# Patient Record
Sex: FEMALE | Race: WHITE | NOT HISPANIC OR LATINO | Employment: OTHER | URBAN - METROPOLITAN AREA
[De-identification: names, ages, dates, MRNs, and addresses within clinical notes are randomized per-mention and may not be internally consistent; named-entity substitution may affect disease eponyms.]

---

## 2024-07-01 PROBLEM — M60.9 MYOSITIS: Status: ACTIVE | Noted: 2024-07-01

## 2024-07-02 ENCOUNTER — OFFICE VISIT (OUTPATIENT)
Age: 64
End: 2024-07-02
Payer: MEDICARE

## 2024-07-02 VITALS
SYSTOLIC BLOOD PRESSURE: 102 MMHG | DIASTOLIC BLOOD PRESSURE: 70 MMHG | BODY MASS INDEX: 27.66 KG/M2 | WEIGHT: 162 LBS | TEMPERATURE: 97.8 F | HEIGHT: 64 IN

## 2024-07-02 DIAGNOSIS — Z79.899 HIGH RISK MEDICATION USE: ICD-10-CM

## 2024-07-02 DIAGNOSIS — R76.8 ABNORMAL ANCA (ANTINEUTROPHIL CYTOPLASMIC ANTIBODY): ICD-10-CM

## 2024-07-02 DIAGNOSIS — M60.9 MYOSITIS, UNSPECIFIED MYOSITIS TYPE, UNSPECIFIED SITE: Primary | ICD-10-CM

## 2024-07-02 DIAGNOSIS — R89.9 ABNORMAL LABORATORY TEST: ICD-10-CM

## 2024-07-02 DIAGNOSIS — J84.9 INTERSTITIAL LUNG DISEASE: ICD-10-CM

## 2024-07-02 RX ORDER — MYCOPHENOLATE MOFETIL 500 MG/1
1000 TABLET ORAL 2 TIMES DAILY
COMMUNITY
Start: 2024-05-16 | End: 2025-05-11

## 2024-07-02 RX ORDER — LOSARTAN POTASSIUM 25 MG/1
0.5 TABLET ORAL DAILY
COMMUNITY
Start: 2024-04-25

## 2024-07-02 RX ORDER — NAPROXEN 375 MG/1
375 TABLET ORAL 2 TIMES DAILY WITH MEALS
COMMUNITY

## 2024-07-02 NOTE — ASSESSMENT & PLAN NOTE
Elevated Lambda and kappa light chains.  Negative Bone survey.  SPEP negative for Mspike.    Plan:    Hematology does not think she has any plasma cell disorder. However, they continue to follow her.   No bone marrow biopsy warranted.

## 2024-07-02 NOTE — ASSESSMENT & PLAN NOTE
12/22 Atypical ANCA negative at < 1:20, CANCA + 1:320, PANCA < 1:20.  MPO negative, PR3 negative.  Denies any Hemoptysis , hematuria or bloody sinus drainage. 3/28/23 has had some recent sinus issues with slight streaks of blood when she blows her nose.   1/23 CANCA 1:160, neg MPO and PR3, UA neg  S/p ENT evaluation - negative other than dry mucosa.  3/23 C-ANCA decreased 1:20  6/23 ANCA negative.    Plan:  Unable to make any ANCA related disease dx at present without tissue.   Recheck ANCA was negative at last visit

## 2024-07-02 NOTE — PROGRESS NOTES
[facility] [place] [facility] Office Follow Up Visit     Office Follow Up      Date: 07/02/2024   Patient Name: Joshua Traylor  MRN: [id number]  YOB: 1960    Referring Physician: Provider, No Known     Chief Complaint   Patient presents with    Joint Pain    Arthritis       History of Present Illness: Joshua Traylor is a 64 y.o. female who is here today for follow up on myositis  History of Present Illness  The patient presents for follow-up of myositis.    The patient reports experiencing pain in her fingers, which she quantifies as a 3 out of 10, without any identifiable alleviating or exacerbating factors. She also experiences morning stiffness that lasts for approximately 10 minutes. Additionally, she experiences pain in her knees, with the right knee being more affected than the left. She underwent hand x-rays at [facility] on 06/14/2023.    The patient's myositis appears to be well-managed. She experiences increased weakness in her arms, which she attributes to her shoulder replacements. She also experiences occasional leg weakness. She denies experiencing any muscle pain. Her current medication regimen includes CellCept 1000 mg twice daily.    In terms of her interstitial lung disease, the patient is currently not utilizing oxygen or steroids. She last saw her pulmonologist 2 to 3 months ago, who referred her to the interstitial lung disease clinic. She has undergone pulmonary function tests, which remain unchanged.       Result Review :        Results  Laboratory Studies  Aldolase was normal at 4.7. ANCA test was negative. C-reactive protein was normal. CK was normal at 62. Urinalysis was negative for infection. Sed rate was normal at 4. Creatinine was 0.83 with a GFR of 78. White blood count, hemoglobin, hematocrit, and platelets were normal.    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 The right hand showed no erosions, mild CMC arthritis and DIP osteoarthritis, worst in the index and the third finger.          Subjective     Allergies   Allergen Reactions    Codeine Provider Review Needed    Contrast Dye (Echo Or Unknown Ct/Mr) Provider Review Needed    Oxazepam Provider Review Needed    Penicillins Provider Review Needed         Current Outpatient Medications:     Dulaglutide (Trulicity) 3 MG/0.5ML solution pen-injector, Inject 0.5 mL under the skin into the appropriate area as directed 1 (One) Time Per Week., Disp: , Rfl:     furosemide (LASIX) 10 MG half tablet, Take 1 half tablet by mouth Daily., Disp: , Rfl:     insulin detemir (LEVEMIR) 100 UNIT/ML injection, Inject  under the skin into the appropriate area as directed.  per prescriber's instructions. Insulin dosing requires individualization., Disp: , Rfl:     levothyroxine (SYNTHROID, LEVOTHROID) 100 MCG tablet, Take 1 tablet by mouth Daily., Disp: , Rfl:     losartan (COZAAR) 25 MG tablet, Take 0.5 tablets by mouth Daily., Disp: , Rfl:     metFORMIN (GLUCOPHAGE) 1000 MG tablet, Take 1 tablet by mouth 2 (Two) Times a Day., Disp: , Rfl:     METOPROLOL SUCCINATE PO, Take 12.5 mg by mouth Daily., Disp: , Rfl:     mycophenolate (CELLCEPT) 500 MG tablet, Take 2 tablets by mouth 2 (Two) Times a Day., Disp: , Rfl:     naproxen (NAPROSYN) 375 MG tablet, Take 1 tablet by mouth 2 (Two) Times a Day With Meals., Disp: , Rfl:     rosuvastatin (CRESTOR) 10 MG tablet, Take 1 tablet by mouth Daily., Disp: , Rfl:     Albuterol Sulfate, sensor, (ProAir Digihaler) 108 (90 Base) MCG/ACT aerosol powder , Inhale 2 puffs. every 4 - 6 hours as needed (Patient not taking: Reported on 7/2/2024), Disp: , Rfl:     budesonide-formoterol (SYMBICORT) 160-4.5 MCG/ACT inhaler, Inhale 1 puff 2 (Two) Times a Day. in the  morning and evening (Patient not taking: Reported on 2024), Disp: , Rfl:     Multiple Vitamins-Minerals (HAIR SKIN & NAILS PO), Take  by mouth Daily. (Patient not taking: Reported on 2024), Disp: , Rfl:     Past Medical History:   Diagnosis Date    Allergic rhinitis     Chronic hyponatremia     COVID     2023-2022    Diabetes     GERD (gastroesophageal reflux disease)     History of colonic polyps     HTN (hypertension)     Hyperlipidemia     Hypothyroidism     ILD (interstitial lung disease)     2023 -Per records related to myositis, abnormal PFT and HRCT.  2023 -Decreased DLCO.    Kidney stones     Multiple thyroid nodules     Myositis     Neurogenic muscular atrophy     Osteoarthritis     Stomach ulcer     Tachycardia     TB lung, latent     Tobacco abuse     Quit smoking 7 months ago from 21    Vertigo     Vitamin D deficiency         Past Surgical History:   Procedure Laterality Date    BIOPSY OF LEG Right     THIGH     SECTION      X3    CHOLECYSTECTOMY      CYSTOSCOPY URETEROSCOPY STONE MANIPULATION/EXTRACTION      Cystoscopy with stone retrieval and ureteral stent    DILATATION AND CURETTAGE      X2    FOOT OSTEOTOMY Left     MUSCLE BIOPSY Right     THIGH    ROTATOR CUFF REPAIR Right     X2    TOTAL SHOULDER REVERSE ARTHROPLASTY Right 10/19/2017    TOTAL SHOULDER REVERSE ARTHROPLASTY Left 2019    WISDOM TOOTH EXTRACTION         Family History   Problem Relation Age of Onset    Hypertension Mother     Heart disease Mother     Heart disease Father     Diabetes Father     Diabetes Sister     Heart disease Sister     Scleroderma Sister     Lupus Sister     Hypertension Son         Social History     Socioeconomic History    Marital status: Unknown   Tobacco Use    Smoking status: Former     Types: Cigarettes     Start date:      Quit date: 1982     Years since quittin.5     Passive exposure: Past    Smokeless tobacco: Never   Vaping Use    Vaping status:  "Never Used   Substance and Sexual Activity    Alcohol use: Yes     Comment: RARELY    Drug use: Never    Sexual activity: Defer       Review of Systems   Constitutional:  Negative for fatigue and fever.   HENT:  Negative for mouth sores, nosebleeds, swollen glands and trouble swallowing.    Eyes:  Negative for blurred vision, double vision, photophobia, pain and visual disturbance.   Respiratory:  Negative for apnea, cough, choking and shortness of breath.    Cardiovascular:  Negative for chest pain, palpitations and leg swelling.   Gastrointestinal:  Negative for abdominal pain, blood in stool, constipation, diarrhea, nausea, vomiting and GERD.   Endocrine: Negative for cold intolerance, heat intolerance, polydipsia, polyphagia and polyuria.   Genitourinary:  Negative for difficulty urinating, dysuria, genital sores, hematuria and urinary incontinence.   Musculoskeletal:  Negative for arthralgias, back pain, gait problem, joint swelling, myalgias, neck pain, neck stiffness and bursitis.        Joint pain    Skin:  Negative for rash.   Allergic/Immunologic: Negative for environmental allergies and food allergies.   Neurological:  Negative for dizziness, tremors, seizures, syncope, weakness, numbness, headache, memory problem and confusion.   Hematological:  Negative for adenopathy. Does not bruise/bleed easily.   Psychiatric/Behavioral:  Negative for sleep disturbance, suicidal ideas, depressed mood and stress. The patient is not nervous/anxious.         I have reviewed and updated the patient's chief complaint, history of present illness, review of systems, past medical history, surgical history, family history, social history, medications and allergy list as appropriate.     Objective    Vital Signs:   Vitals:    07/02/24 1508   BP: 102/70   Temp: 97.8 °F (36.6 °C)   Weight: 73.5 kg (162 lb)   Height: 162.6 cm (64\")   PainSc:   3   PainLoc: Duglas Traylor reports a pain score of 3.  Given her pain assessment " as noted, treatment options were discussed and the following options were decided upon as a follow-up plan to address the patient's pain: Treatment plan as below      Body mass index is 27.81 kg/m².  BMI cannot be calculated due to outdated height or weight values.  Please input a current height/weight in Vitals and re-renter BMIFOLLOWUP in Note to pull in correct documentation based on BMI range.      Physical Exam   Physical Exam  Constitutional: Alert, well-developed female.  HEENT: Extraocular muscles are intact and pupils are round.  Pharynx negative . Head is atraumatic and normocephalic.  Pulmonary: Lung sounds are normal.  Cardiac: Heart has a regular rate and rhythm.  Abdomen: Abdomen is soft with good bowel sounds.  Spine: Cervical spine has excellent range of motion. Thoracic and lumbar spine are nontender to touch.   Muscles: Fibromyalgia syndrome tender points are negative.    Joints: Left arm can raise fully, but right arm limited due to bilateral shoulder replacements. Hands exhibit a few Heberden's nodes and first CMC thickening. Crepitus in the right knee, but all other joints are nontender with no swelling.   Neuro: Alert and oriented x 3 grossly intact; muscle strength is 5/5. Forearms were tested instead of proximal upper extremity musculature due to issues related to shoulder replacements.  Skin is negative for rash.  Psych: Appropriate mood and affect  Physical Exam  There is currently no information documented on the homunculus. Go to the Rheumatology activity and complete the homunculus joint exam.     Results Review:   Imaging Results (Last 24 Hours)       ** No results found for the last 24 hours. **            Procedures    Assessment / Plan    Assessment/Plan:   Diagnoses and all orders for this visit:    1. Myositis, unspecified myositis type, unspecified site (Primary)  Assessment & Plan:  Positive muscle pain, weakness in thighs, Weight loss 26 lbs.  Patient has ILD. Swallowing eval  negative.  Original CK elevated during hospitalization. and EMG/NCS consistent with Myositis.  Aldolase normal, CRP normal, CK 55 normal, Urine myoglobin neg, Esr 21 in March 2023. UA negative for White cells but grew Ecoli ? Contaminated.   CT of abdomen and pelvis negative.   Routine cancer screenings are UTD and negative.  At March 2023 visit her rash, muscle pain, joint pain resolved but she still had quad weakness.   Currently she is having pain in her wrists, ankles, and knees. Quad weakness has resolved.   Doing very well currently.     Plan:    Currently on Cellcept.  1000 mg twice daily  Continue bone density with pcp q 2 years.  Uses naproxen as needed    Her [facility] biopsy said she had type 2 muscle atrophy and acute neurogenic atrophy.  See below for further plan    Orders:  -     Cancel: Comprehensive Metabolic Panel; Future  -     Cancel: Aldolase; Future  -     Cancel: CK; Future  -     Cancel: C-reactive Protein; Future  -     Cancel: Sedimentation Rate; Future  -     Cancel: CBC (No Diff); Future  -     Cancel: ANCA Panel; Future  -     Aldolase; Future  -     ANCA Panel; Future  -     C-reactive Protein; Future  -     CBC (No Diff); Future  -     CK; Future  -     Comprehensive Metabolic Panel; Future  -     Sedimentation Rate; Future    2. Interstitial lung disease  Assessment & Plan:  SOB and Rash started one week after Covid. Developed weakness and noted to have interstitial thickening with Bronchiolectasis, Elevated CK.   Abnormal HRCT, PFT, Decreased DLCO.  Seeing Pulmonary Dr. Toscano.  1/23 ESR and CRP normal, RF3 neg, CCP neg, XAVIER 1:640 nucleolar (can be seen with mixed CTD), CCP neg, Jo1 neg.  HRCT 11/22 Chronic ILD with interstitial fibrosis with UIP pattern. No honeycombing, LLL nodule favored to be scar, Small pericardial effusion, multifocal air trapping.  Loop Spirometry performed.    Plan:  Off O2 at night. Off Symbicort.  Currently off steroid.  Continuing on mycophenolate.      Ct report from 10/23 mentions that some of her nodularities have resolved, but does not quantitate the interstitial findings.     Orders:  -     Cancel: Comprehensive Metabolic Panel; Future  -     Cancel: Aldolase; Future  -     Cancel: CK; Future  -     Cancel: C-reactive Protein; Future  -     Cancel: Sedimentation Rate; Future  -     Cancel: CBC (No Diff); Future  -     Cancel: ANCA Panel; Future  -     Aldolase; Future  -     ANCA Panel; Future  -     C-reactive Protein; Future  -     CBC (No Diff); Future  -     CK; Future  -     Comprehensive Metabolic Panel; Future  -     Sedimentation Rate; Future    3. Abnormal laboratory test  Assessment & Plan:  Elevated Lambda and kappa light chains.  Negative Bone survey.  SPEP negative for Mspike.    Plan:    Hematology does not think she has any plasma cell disorder. However, they continue to follow her.   No bone marrow biopsy warranted.         4. Abnormal ANCA (antineutrophil cytoplasmic antibody)  Assessment & Plan:  12/22 Atypical ANCA negative at < 1:20, CANCA + 1:320, PANCA < 1:20.  MPO negative, PR3 negative.  Denies any Hemoptysis , hematuria or bloody sinus drainage. 3/28/23 has had some recent sinus issues with slight streaks of blood when she blows her nose.   1/23 CANCA 1:160, neg MPO and PR3, UA neg  S/p ENT evaluation - negative other than dry mucosa.  3/23 C-ANCA decreased 1:20  6/23 ANCA negative.    Plan:  Unable to make any ANCA related disease dx at present without tissue.   Recheck ANCA was negative at last visit        Orders:  -     Cancel: Comprehensive Metabolic Panel; Future  -     Cancel: Aldolase; Future  -     Cancel: CK; Future  -     Cancel: C-reactive Protein; Future  -     Cancel: Sedimentation Rate; Future  -     Cancel: CBC (No Diff); Future  -     Cancel: ANCA Panel; Future  -     Aldolase; Future  -     ANCA Panel; Future  -     C-reactive Protein; Future  -     CBC (No Diff); Future  -     CK; Future  -     Comprehensive  Metabolic Panel; Future  -     Sedimentation Rate; Future    5. High risk medication use  Assessment & Plan:  On Mycophenolate - well tolerated and effective.    Plan:    CBC, CMP q 3 months - 1/24 abnormal light chain evaluation.     Would hold Cellcept for any infection or illness, Seek treatment and when well and illness is resolved you may restart medication.    Orders:  -     Cancel: Comprehensive Metabolic Panel; Future  -     Cancel: Aldolase; Future  -     Cancel: CK; Future  -     Cancel: C-reactive Protein; Future  -     Cancel: Sedimentation Rate; Future  -     Cancel: CBC (No Diff); Future  -     Cancel: ANCA Panel; Future  -     Aldolase; Future  -     ANCA Panel; Future  -     C-reactive Protein; Future  -     CBC (No Diff); Future  -     CK; Future  -     Comprehensive Metabolic Panel; Future  -     Sedimentation Rate; Future             Assessment & Plan  1. Myositis.  The patient's laboratory results are satisfactory, with inflammation tests and muscle enzymes showing satisfactory results. The patient is advised to maintain her current regimen of CellCept 1000 mg twice daily. Comprehensive metabolic panel (CMP) and CK, aldolase, sedimentation rate, and CRP will be reassessed during her next laboratory tests.    2. Interstitial lung disease.  The patient is advised to continue her follow-ups with the interstitial lung disease clinic.    Follow-up  A follow-up appointment is scheduled for 4 months from now.        Follow Up:   Return in about 4 months (around 11/2/2024).    Scribed for Jenny Cervantes MD by Jenny Cervantes MD. 7/5/2024  07:56 EDT     Patient or patient representative verbalized consent for the use of Ambient Listening during the visit with  Jenny Cervantes MD for chart documentation. 7/5/2024  18:11 EDT  I, Jenny Cervantes MD, personally performed the services described in this documentation as scribed by the above named individual in my presence, and it is both accurate and complete.   7/5/2024  07:56 EDT    Jenny Cervantes MD  [facility] [place] [facility]

## 2024-07-02 NOTE — ASSESSMENT & PLAN NOTE
On Mycophenolate - well tolerated and effective.    Plan:    CBC, CMP q 3 months - 1/24 abnormal light chain evaluation.     Would hold Cellcept for any infection or illness, Seek treatment and when well and illness is resolved you may restart medication.

## 2024-07-02 NOTE — ASSESSMENT & PLAN NOTE
Positive muscle pain, weakness in thighs, Weight loss 26 lbs.  Patient has ILD. Swallowing eval negative.  Original CK elevated during hospitalization. and EMG/NCS consistent with Myositis.  Aldolase normal, CRP normal, CK 55 normal, Urine myoglobin neg, Esr 21 in March 2023. UA negative for White cells but grew Ecoli ? Contaminated.   CT of abdomen and pelvis negative.   Routine cancer screenings are UTD and negative.  At March 2023 visit her rash, muscle pain, joint pain resolved but she still had quad weakness.   Currently she is having pain in her wrists, ankles, and knees. Quad weakness has resolved.   Doing very well currently.     Plan:    Currently on Cellcept.  1000 mg twice daily  Continue bone density with pcp q 2 years.  Uses naproxen as needed    Her [facility] biopsy said she had type 2 muscle atrophy and acute neurogenic atrophy.  See below for further plan

## 2024-07-02 NOTE — ASSESSMENT & PLAN NOTE
SOB and Rash started one week after Covid. Developed weakness and noted to have interstitial thickening with Bronchiolectasis, Elevated CK.   Abnormal HRCT, PFT, Decreased DLCO.  Seeing Pulmonary Dr. Toscano.  1/23 ESR and CRP normal, RF3 neg, CCP neg, XAVIER 1:640 nucleolar (can be seen with mixed CTD), CCP neg, Jo1 neg.  HRCT 11/22 Chronic ILD with interstitial fibrosis with UIP pattern. No honeycombing, LLL nodule favored to be scar, Small pericardial effusion, multifocal air trapping.  Loop Spirometry performed.    Plan:  Off O2 at night. Off Symbicort.  Currently off steroid.  Continuing on mycophenolate.     Ct report from 10/23 mentions that some of her nodularities have resolved, but does not quantitate the interstitial findings.

## 2024-10-29 ENCOUNTER — TELEPHONE (OUTPATIENT)
Age: 64
End: 2024-10-29
Payer: MEDICARE

## 2024-10-29 NOTE — TELEPHONE ENCOUNTER
Hub staff attempted to follow warm transfer process and was unsuccessful     Caller: Joshua Traylor    Relationship to patient: Self    Best call back number: [phone]    Patient is needing: PT HAD A MISSED CALL FROM OFFICE. NOT SURE WHAT IT IS REGARDING. PLEASE CALL PT TO ADVISE

## 2024-10-29 NOTE — TELEPHONE ENCOUNTER
Pt states she wants to see [facility]. Pt does not have [employer] so we can leave a message on vm

## 2024-10-29 NOTE — TELEPHONE ENCOUNTER
PT HAD TO BE CANCELLED DUE TO A PROVIDER EMERGENCY. I HAVE REACHED OUT VIA ARTERA, [employer] AND PHONE. IF PT CALLS BACK TO RESCHEDULE, PLEASE SCHEDULE WITH MIK OLIVIER OR FREDY. IF THE PT WANTS TO SEE DR. MESA, SHE IS BOOKED UNTIL FEB. PT HAS BEEN ADDED TO THE CANCELLATION LIST AS HIGH PRIORITY.         -HUB OKAY TO SCHEDULE.

## 2024-10-31 ENCOUNTER — TELEPHONE (OUTPATIENT)
Age: 64
End: 2024-10-31
Payer: MEDICARE

## 2024-10-31 NOTE — TELEPHONE ENCOUNTER
Pt states she had labs done recently and will have them sent to us. Advised pt we will send lab order to do before appt in the next few weeks

## 2024-10-31 NOTE — TELEPHONE ENCOUNTER
Caller: Joshua Traylor    Relationship to patient: Self    Best call back number: [phone]     Patient is needing: DR. MESA HAD PATIENT COMPLETE LABS AFTER LAST VISIT. PATIENT IS WANTING TO KNOW IF SHE NEEDS TO REPEAT LABS AND IF SO, CAN THOSE ORDERS BE MAILED TO HER. PLEASE CALL BACK TO ADVISE, HER NEXT VISIT IS SCHEDULED FOR 3/11/25.

## 2025-02-14 ENCOUNTER — TELEPHONE (OUTPATIENT)
Age: 65
End: 2025-02-14
Payer: MEDICARE

## 2025-02-14 NOTE — TELEPHONE ENCOUNTER
PT APPT HAD TO BE CANCELLED. IF PT CALLS BACK TO RESCHEDULE, PLEASE SCHEDULE WITH MIK OLIVIER OR FREDY. PLEASE ADD PT TO THE CANCELLATION LIST AS NORMAL PRIOTIRY WITH AN END DATE OF 12/31/2025.   -HUB READY TO SCHEDULE.

## 2025-04-02 NOTE — ASSESSMENT & PLAN NOTE
1/24/23: XAVIER 1:640 nucleolar, c-ANCA 1:160, CCP neg, Ashley-1 neg,  (182), serum myoglobin 115 (58), urine myoglobin normal, RF negative, ESR normal, CRP normal.  Positive muscle pain, weakness in thighs, Weight loss 26 lbs.  Patient has ILD. Swallowing eval negative.  EMG/NCS reportedly consistent with myositis.  Aldolase normal, CRP normal, CK 55 normal, serum and urine myoglobin neg, ESR 21 in March 2023   CT of abdomen and pelvis negative. Routine cancer screenings are UTD and negative.  Her [facility] biopsy 2/2023 said she had type 2 muscle atrophy and acute neurogenic atrophy.    Has been off CellCept for 4 months and no muscle symptoms  Check labs today

## 2025-04-02 NOTE — ASSESSMENT & PLAN NOTE
No longer on CellCept    10/9/2024: CK normal, CRP 0.6 (<0.5), ESR 30 (0-20), ANCA panel negative, aldolase normal, CMP not drawn

## 2025-04-02 NOTE — ASSESSMENT & PLAN NOTE
Elevated Lambda and kappa light chains.  Negative Bone survey.  SPEP negative for Mspike.    Hematology does not think she has any plasma cell disorder. However, they continue to follow her.   No bone marrow biopsy warranted

## 2025-04-02 NOTE — ASSESSMENT & PLAN NOTE
12/22 Atypical ANCA negative at <1:20, C-ANCA + 1:320, P-ANCA < 1:20.  MPO negative, PR3 negative  Denies any Hemoptysis , hematuria or bloody sinus drainage.   3/28/23 has had some recent sinus issues with slight streaks of blood when she blows her nose.   1/23 C-ANCA 1:160, neg MPO and PR3, UA neg  S/p ENT evaluation - negative other than dry mucosa.  3/23 C-ANCA decreased 1:20  6/23 and 10/2024 ANCA negative.    Unable to make any ANCA related disease dx at present

## 2025-04-02 NOTE — PROGRESS NOTES
[facility] Office Follow Up Visit     Office Follow Up      Date: 04/04/2025   Patient Name: Joshua Traylor  MRN: [id number]  YOB: 1960    Referring Physician: No ref. provider found     Chief Complaint   Patient presents with    Myositis, unspecified myositis type, unspecified site       History of Present Illness: Joshua Traylor is a 65 y.o. female who is here today for follow up on myositis  History of Present Illness  She last saw Dr. Cervantes 7/2/24.     She stopped CellCept about 4 months ago. So far she feels her breathing and muscles have been doing well without it thus far.     Continues to follow with Dr. Solano in [place], [place] for pulmonology.     The patient reports experiencing pain in her hands, which she quantifies as a 3 out of 10, without any identifiable alleviating or exacerbating factors. She also experiences morning stiffness that lasts for approximately 10 minutes. Additionally, she experiences pain in her knees, with the right knee being more affected than the left. She no longer takes naproxen due to gastritis. She does take Tylenol Arthritis as needed.    The patient's myositis appears to be well-managed. She experiences increased weakness in her arms, which she attributes to her shoulder replacements. She denies experiencing any muscle pain.       Subjective     Allergies   Allergen Reactions    Codeine Provider Review Needed    Contrast Dye (Echo Or Unknown Ct/Mr) Provider Review Needed    Fexofenadine Hcl Other (See Comments)    Oxazepam Provider Review Needed    Penicillins Provider Review Needed         Current Outpatient Medications:     acetaminophen (TYLENOL) 650 MG 8 hr tablet, Take 1 tablet by mouth Every 8 (Eight) Hours As Needed for Mild Pain., Disp: , Rfl:     Continuous Glucose  (Dexcom G7 ) device, USE 1 UNIT TO CHECK GLUCOSE AS DIRECTED, Disp: , Rfl:     Continuous Glucose Sensor (Dexcom G7 Sensor) misc, USE 1  SENSOR TO CHECK GLUCOSE, CHANGING SENSOR EVERY 10 DAYS AS DIRECTED, Disp: , Rfl:     ESTRACE VAGINAL 0.1 MG/GM vaginal cream, 0.1 mg., Disp: , Rfl:     furosemide (LASIX) 10 MG half tablet, Take 1 half tablet by mouth Daily., Disp: , Rfl:     Lantus SoloStar 100 UNIT/ML injection pen, 32 unit(s), Subcutaneous, BID, Qty: 15 mL, 3 Refill(s), Total Refills: 3, Requisition Routing Type Route to Pharmacy Electronically, Pharmacy: [place] [id number], Disp: , Rfl:     levothyroxine (SYNTHROID, LEVOTHROID) 112 MCG tablet, = 1 tab(s), PO, qDay, Qty: 90 tab(s), 3 Refill(s), Requisition Routing Type Route to Pharmacy Electronically, Pharmacy: [place] [id number], Disp: , Rfl:     losartan (COZAAR) 25 MG tablet, Take 0.5 tablets by mouth Daily., Disp: , Rfl:     metFORMIN (GLUCOPHAGE) 1000 MG tablet, Take 1 tablet by mouth 2 (Two) Times a Day., Disp: , Rfl:     metoprolol succinate XL (TOPROL-XL) 25 MG 24 hr tablet, take 1/2 (one-half) tablet by mouth once daily, Disp: , Rfl:     Ozempic, 0.25 or 0.5 MG/DOSE, 2 MG/3ML solution pen-injector, 0.5 mg, Subcutaneous, qWeek, rotate injection sites, Qty: 9 Each, 3 Refill(s), Total Refills: 3, Requisition Routing Type Route to Pharmacy Electronically, Pharmacy: [place] [id number], Disp: , Rfl:     rosuvastatin (CRESTOR) 10 MG tablet, Take 1 tablet by mouth Daily., Disp: , Rfl:     Past Medical History:   Diagnosis Date    Allergic rhinitis     Chronic hyponatremia     COVID     1/24/2023-8/2022    Diabetes     GERD (gastroesophageal reflux disease)     History of colonic polyps     HTN (hypertension)     Hyperlipidemia     Hypothyroidism     ILD (interstitial lung disease)     01/24/2023 -Per records related to myositis, abnormal PFT and HRCT.  01/24/2023 -Decreased DLCO.    Kidney stones     Multiple thyroid nodules     Myositis     Neurogenic muscular atrophy     Osteoarthritis     Stomach ulcer     Tachycardia     TB lung, latent     Tobacco abuse     Quit smoking 7 months  "ago from 23    Vertigo     Vitamin D deficiency         Past Surgical History:   Procedure Laterality Date    BIOPSY OF LEG Right     THIGH     SECTION      X3    CHOLECYSTECTOMY      CYSTOSCOPY URETEROSCOPY STONE MANIPULATION/EXTRACTION      Cystoscopy with stone retrieval and ureteral stent    DILATATION AND CURETTAGE      X2    FOOT OSTEOTOMY Left     MUSCLE BIOPSY Right     THIGH    ROTATOR CUFF REPAIR Right     X2    TOTAL SHOULDER REVERSE ARTHROPLASTY Right 10/19/2017    TOTAL SHOULDER REVERSE ARTHROPLASTY Left 2019    WISDOM TOOTH EXTRACTION         Family History   Problem Relation Age of Onset    Hypertension Mother     Heart disease Mother     Heart disease Father     Diabetes Father     Diabetes Sister     Heart disease Sister     Scleroderma Sister     Lupus Sister     Hypertension Son         Social History     Socioeconomic History    Marital status:    Tobacco Use    Smoking status: Former     Types: Cigarettes     Start date:      Quit date:      Years since quittin.2     Passive exposure: Past    Smokeless tobacco: Never   Vaping Use    Vaping status: Never Used   Substance and Sexual Activity    Alcohol use: Yes     Comment: RARELY    Drug use: Never    Sexual activity: Defer       Review of Systems positive for dental problem, painful urination, pelvic pain, joint pain.  Otherwise negative ROS.    I have reviewed and updated the patient's chief complaint, history of present illness, review of systems, past medical history, surgical history, family history, social history, medications and allergy list as appropriate.     Objective    Vital Signs:   Vitals:    25 1305   BP: 98/60   BP Location: Left arm   Patient Position: Sitting   Cuff Size: Adult   Pulse: 83   Temp: 97.7 °F (36.5 °C)   Weight: 74.4 kg (164 lb)   Height: 162.6 cm (64.02\")   PainSc: 3        Body mass index is 28.14 kg/m².  Defer to PCP      Physical Exam   Physical Exam  Constitutional: " Alert, well-developed female.  HEENT: Extraocular muscles are intact and pupils are round.  Pharynx negative . Head is atraumatic and normocephalic.  Pulmonary: Lung sounds are normal.  Cardiac: Heart has a regular rate and rhythm.  Spine: Cervical spine has excellent range of motion. Thoracic and lumbar spine are nontender to touch.   Muscles: Fibromyalgia syndrome tender points are negative.    Joints: Left arm can raise fully, but right arm limited due to bilateral shoulder replacements. Hands exhibit a few Heberden's nodes and first CMC thickening. Crepitus in the right knee, but all other joints are nontender with no swelling.   Neuro: Alert and oriented x 3 grossly intact; muscle strength is 5/5.   Skin is negative for rash.  Psych: Appropriate mood and affect      Assessment / Plan    Assessment/Plan:   Diagnoses and all orders for this visit:    1. Myositis, unspecified myositis type, unspecified site (Primary)  Assessment & Plan:  1/24/23: XAVIER 1:640 nucleolar, c-ANCA 1:160, CCP neg, Ashley-1 neg,  (182), serum myoglobin 115 (58), urine myoglobin normal, RF negative, ESR normal, CRP normal.  Positive muscle pain, weakness in thighs, Weight loss 26 lbs.  Patient has ILD. Swallowing eval negative.  EMG/NCS reportedly consistent with myositis.  Aldolase normal, CRP normal, CK 55 normal, serum and urine myoglobin neg, ESR 21 in March 2023   CT of abdomen and pelvis negative. Routine cancer screenings are UTD and negative.  Her [facility] biopsy 2/2023 said she had type 2 muscle atrophy and acute neurogenic atrophy.    Has been off CellCept for 4 months and no muscle symptoms  Check labs today    Orders:  -     Aldolase; Future  -     CK; Future  -     Comprehensive Metabolic Panel; Future  -     C-reactive Protein; Future  -     Sedimentation Rate; Future  -     CBC (No Diff); Future    2. Interstitial lung disease  Assessment & Plan:  SOB and rash started one week after Covid.   Developed weakness   She  was noted to have interstitial thickening with bronchiolectasis, Elevated CK.   Abnormal HRCT, PFT, Decreased DLCO.  Seeing Pulmonary Dr. Toscano.  1/23 ESR and CRP normal, RF3 neg, CCP neg, XAVIER 1:640 nucleolar (can be seen with mixed CTD), CCP neg, Jo1 neg.  HRCT 11/22 Chronic ILD with interstitial fibrosis with UIP pattern. No honeycombing, LLL nodule favored to be scar, Small pericardial effusion, multifocal air trapping.  CT report from 10/23 mentions that some of her nodularities have resolved, but does not quantitate the interstitial findings  11/2024 CT chest No significant interval change of bilateral predominantly mid and lower lung faint groundglass opacities with mosaic attenuation pattern with associated bilateral multifocal areas of air trapping on the expiratory images. Also noted nonspecific interstitial thickening with associated mild traction bronchiectasis in bilateral lower lobes. No honeycombing. No suspicious pulmonary nodule. No consolidation. No lymphadenopathy.    She follows with [facility] Solano pulmonology in [place], [place]. I will request latest records.  She is not on any steroids currently  Dr. Solano stopped her CellCept about 4 months ago. She feels well without it thus far.   Continue follow up with pulm  Labs today  RTC 4 to 6 months    Orders:  -     Aldolase; Future  -     CK; Future  -     Comprehensive Metabolic Panel; Future  -     C-reactive Protein; Future  -     Sedimentation Rate; Future  -     CBC (No Diff); Future    3. High risk medication use  Assessment & Plan:  No longer on CellCept    10/9/2024: CK normal, CRP 0.6 (<0.5), ESR 30 (0-20), ANCA panel negative, aldolase normal, CMP not drawn    Orders:  -     Aldolase; Future  -     CK; Future  -     Comprehensive Metabolic Panel; Future  -     C-reactive Protein; Future  -     Sedimentation Rate; Future  -     CBC (No Diff); Future    4. Abnormal ANCA (antineutrophil cytoplasmic antibody)  Assessment & Plan:  12/22 Atypical  ANCA negative at <1:20, C-ANCA + 1:320, P-ANCA < 1:20.  MPO negative, PR3 negative  Denies any Hemoptysis , hematuria or bloody sinus drainage.   3/28/23 has had some recent sinus issues with slight streaks of blood when she blows her nose.   1/23 C-ANCA 1:160, neg MPO and PR3, UA neg  S/p ENT evaluation - negative other than dry mucosa.  3/23 C-ANCA decreased 1:20  6/23 and 10/2024 ANCA negative.    Unable to make any ANCA related disease dx at present      5. Abnormal laboratory test  Assessment & Plan:  Elevated Lambda and kappa light chains.  Negative Bone survey.  SPEP negative for Mspike.    Hematology does not think she has any plasma cell disorder. However, they continue to follow her.   No bone marrow biopsy warranted         6. 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 The right hand showed no erosions, mild CMC arthritis and DIP osteoarthritis, worst in the index and the third finger.    No longer taking naproxen due to gastritis  Tylenol Arthritis is okay to take as needed  Could consider trial of tramadol if needed, but currently she feels her pain is stable      7. Postmenopause  Assessment & Plan:  She has a history of several fractures  Has not had DEXA in ~5 years    Order DEXA today    Orders:  -     DEXA Bone Density Axial; Future      Follow Up:   Return 4-6 months, for Dr. Lee.    RUCHI Rivera  [facility]

## 2025-04-02 NOTE — ASSESSMENT & PLAN NOTE
SOB and rash started one week after Covid.   Developed weakness   She was noted to have interstitial thickening with bronchiolectasis, Elevated CK.   Abnormal HRCT, PFT, Decreased DLCO.  Seeing Pulmonary Dr. Toscano.  1/23 ESR and CRP normal, RF3 neg, CCP neg, XAVIER 1:640 nucleolar (can be seen with mixed CTD), CCP neg, Jo1 neg.  HRCT 11/22 Chronic ILD with interstitial fibrosis with UIP pattern. No honeycombing, LLL nodule favored to be scar, Small pericardial effusion, multifocal air trapping.  CT report from 10/23 mentions that some of her nodularities have resolved, but does not quantitate the interstitial findings  11/2024 CT chest No significant interval change of bilateral predominantly mid and lower lung faint groundglass opacities with mosaic attenuation pattern with associated bilateral multifocal areas of air trapping on the expiratory images. Also noted nonspecific interstitial thickening with associated mild traction bronchiectasis in bilateral lower lobes. No honeycombing. No suspicious pulmonary nodule. No consolidation. No lymphadenopathy.    She follows with [facility] Solano pulmonology in [place], [place]. I will request latest records.  She is not on any steroids currently  Dr. Solano stopped her CellCept about 4 months ago. She feels well without it thus far.   Continue follow up with pulm  Labs today  RTC 4 to 6 months

## 2025-04-04 ENCOUNTER — LAB (OUTPATIENT)
Facility: HOSPITAL | Age: 65
End: 2025-04-04
Payer: MEDICARE

## 2025-04-04 ENCOUNTER — OFFICE VISIT (OUTPATIENT)
Age: 65
End: 2025-04-04
Payer: MEDICARE

## 2025-04-04 VITALS
TEMPERATURE: 97.7 F | SYSTOLIC BLOOD PRESSURE: 98 MMHG | BODY MASS INDEX: 28 KG/M2 | WEIGHT: 164 LBS | HEIGHT: 64 IN | DIASTOLIC BLOOD PRESSURE: 60 MMHG | HEART RATE: 83 BPM

## 2025-04-04 DIAGNOSIS — M60.9 MYOSITIS, UNSPECIFIED MYOSITIS TYPE, UNSPECIFIED SITE: ICD-10-CM

## 2025-04-04 DIAGNOSIS — R89.9 ABNORMAL LABORATORY TEST: ICD-10-CM

## 2025-04-04 DIAGNOSIS — M15.0 PRIMARY OSTEOARTHRITIS INVOLVING MULTIPLE JOINTS: ICD-10-CM

## 2025-04-04 DIAGNOSIS — R76.8 ABNORMAL ANCA (ANTINEUTROPHIL CYTOPLASMIC ANTIBODY): ICD-10-CM

## 2025-04-04 DIAGNOSIS — M60.9 MYOSITIS, UNSPECIFIED MYOSITIS TYPE, UNSPECIFIED SITE: Primary | ICD-10-CM

## 2025-04-04 DIAGNOSIS — Z78.0 POSTMENOPAUSE: ICD-10-CM

## 2025-04-04 DIAGNOSIS — J84.9 INTERSTITIAL LUNG DISEASE: ICD-10-CM

## 2025-04-04 DIAGNOSIS — Z79.899 HIGH RISK MEDICATION USE: ICD-10-CM

## 2025-04-04 PROBLEM — Z96.612 S/P BILATERAL SHOULDER JOINT REPLACEMENT: Status: ACTIVE | Noted: 2025-04-04

## 2025-04-04 PROBLEM — E03.9 HYPOTHYROIDISM: Status: ACTIVE | Noted: 2025-04-04

## 2025-04-04 PROBLEM — Z87.11 HISTORY OF GASTRIC ULCER: Status: ACTIVE | Noted: 2025-04-04

## 2025-04-04 PROBLEM — E04.2 MULTIPLE THYROID NODULES: Status: ACTIVE | Noted: 2025-04-04

## 2025-04-04 PROBLEM — E11.9 TYPE 2 DIABETES MELLITUS: Status: ACTIVE | Noted: 2025-04-04

## 2025-04-04 PROBLEM — Z96.611 S/P BILATERAL SHOULDER JOINT REPLACEMENT: Status: ACTIVE | Noted: 2025-04-04

## 2025-04-04 PROBLEM — E55.9 VITAMIN D DEFICIENCY: Status: ACTIVE | Noted: 2025-04-04

## 2025-04-04 PROBLEM — K21.9 GASTROESOPHAGEAL REFLUX DISEASE: Status: ACTIVE | Noted: 2025-04-04

## 2025-04-04 PROBLEM — M19.90 OSTEOARTHRITIS: Status: ACTIVE | Noted: 2025-04-04

## 2025-04-04 PROBLEM — E78.2 MIXED HYPERLIPIDEMIA: Status: ACTIVE | Noted: 2025-04-04

## 2025-04-04 PROBLEM — I10 ESSENTIAL HYPERTENSION: Status: ACTIVE | Noted: 2025-04-04

## 2025-04-04 LAB
ALBUMIN SERPL-MCNC: 4.4 G/DL (ref 3.5–5.2)
ALBUMIN/GLOB SERPL: 1.2 G/DL
ALP SERPL-CCNC: 102 U/L (ref 39–117)
ALT SERPL W P-5'-P-CCNC: 14 U/L (ref 1–33)
ANION GAP SERPL CALCULATED.3IONS-SCNC: 13.4 MMOL/L (ref 5–15)
AST SERPL-CCNC: 16 U/L (ref 1–32)
BILIRUB SERPL-MCNC: 0.2 MG/DL (ref 0–1.2)
BUN SERPL-MCNC: 25 MG/DL (ref 8–23)
BUN/CREAT SERPL: 32.9 (ref 7–25)
CALCIUM SPEC-SCNC: 10 MG/DL (ref 8.6–10.5)
CHLORIDE SERPL-SCNC: 102 MMOL/L (ref 98–107)
CK SERPL-CCNC: 65 U/L (ref 20–180)
CO2 SERPL-SCNC: 24.6 MMOL/L (ref 22–29)
CREAT SERPL-MCNC: 0.76 MG/DL (ref 0.57–1)
CRP SERPL-MCNC: 0.34 MG/DL (ref 0–0.5)
DEPRECATED RDW RBC AUTO: 45.4 FL (ref 37–54)
EGFRCR SERPLBLD CKD-EPI 2021: 86 ML/MIN/1.73
ERYTHROCYTE [DISTWIDTH] IN BLOOD BY AUTOMATED COUNT: 14.7 % (ref 12.3–15.4)
ERYTHROCYTE [SEDIMENTATION RATE] IN BLOOD: 36 MM/HR (ref 0–30)
GLOBULIN UR ELPH-MCNC: 3.7 GM/DL
GLUCOSE SERPL-MCNC: 68 MG/DL (ref 65–99)
HCT VFR BLD AUTO: 39.1 % (ref 34–46.6)
HGB BLD-MCNC: 12.4 G/DL (ref 12–15.9)
MCH RBC QN AUTO: 26.9 PG (ref 26.6–33)
MCHC RBC AUTO-ENTMCNC: 31.7 G/DL (ref 31.5–35.7)
MCV RBC AUTO: 84.8 FL (ref 79–97)
PLATELET # BLD AUTO: 299 10*3/MM3 (ref 140–450)
PMV BLD AUTO: 10.5 FL (ref 6–12)
POTASSIUM SERPL-SCNC: 4.2 MMOL/L (ref 3.5–5.2)
PROT SERPL-MCNC: 8.1 G/DL (ref 6–8.5)
RBC # BLD AUTO: 4.61 10*6/MM3 (ref 3.77–5.28)
SODIUM SERPL-SCNC: 140 MMOL/L (ref 136–145)
WBC NRBC COR # BLD AUTO: 10.05 10*3/MM3 (ref 3.4–10.8)

## 2025-04-04 PROCEDURE — 80053 COMPREHEN METABOLIC PANEL: CPT

## 2025-04-04 PROCEDURE — 83516 IMMUNOASSAY NONANTIBODY: CPT

## 2025-04-04 PROCEDURE — 86037 ANCA TITER EACH ANTIBODY: CPT

## 2025-04-04 PROCEDURE — 82550 ASSAY OF CK (CPK): CPT

## 2025-04-04 PROCEDURE — 85652 RBC SED RATE AUTOMATED: CPT

## 2025-04-04 PROCEDURE — 82085 ASSAY OF ALDOLASE: CPT

## 2025-04-04 PROCEDURE — 86140 C-REACTIVE PROTEIN: CPT

## 2025-04-04 PROCEDURE — 85027 COMPLETE CBC AUTOMATED: CPT

## 2025-04-04 RX ORDER — MYCOPHENOLATE MOFETIL 500 MG/1
1000 TABLET ORAL 2 TIMES DAILY
Qty: 120 TABLET | Refills: 11 | Status: CANCELLED | OUTPATIENT
Start: 2025-04-04 | End: 2026-03-30

## 2025-04-04 RX ORDER — INSULIN GLARGINE 100 [IU]/ML
INJECTION, SOLUTION SUBCUTANEOUS
COMMUNITY
Start: 2024-05-22 | End: 2025-08-24

## 2025-04-04 RX ORDER — LEVOTHYROXINE SODIUM 112 UG/1
TABLET ORAL
COMMUNITY
Start: 2025-03-27

## 2025-04-04 RX ORDER — ACYCLOVIR 400 MG/1
TABLET ORAL
COMMUNITY
Start: 2025-03-06

## 2025-04-04 RX ORDER — ASPIRIN 81 MG/1
81 TABLET, CHEWABLE ORAL DAILY
COMMUNITY
End: 2025-04-04

## 2025-04-04 RX ORDER — METOPROLOL SUCCINATE 25 MG/1
TABLET, EXTENDED RELEASE ORAL
COMMUNITY
Start: 2025-02-27

## 2025-04-04 RX ORDER — SEMAGLUTIDE 0.68 MG/ML
INJECTION, SOLUTION SUBCUTANEOUS
COMMUNITY
Start: 2025-03-05

## 2025-04-04 RX ORDER — SENNOSIDES 8.6 MG
650 CAPSULE ORAL EVERY 8 HOURS PRN
COMMUNITY

## 2025-04-04 RX ORDER — ESTRADIOL 0.1 MG/G
0.1 CREAM VAGINAL
COMMUNITY
Start: 2025-04-01

## 2025-04-04 RX ORDER — ACYCLOVIR 400 MG/1
TABLET ORAL
COMMUNITY
Start: 2025-03-18

## 2025-04-04 NOTE — ASSESSMENT & PLAN NOTE
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 The right hand showed no erosions, mild CMC arthritis and DIP osteoarthritis, worst in the index and the third finger.    No longer taking naproxen due to gastritis  Tylenol Arthritis is okay to take as needed  Could consider trial of tramadol if needed, but currently she feels her pain is stable

## 2025-04-07 LAB
ALDOLASE SERPL-CCNC: 3.2 U/L (ref 3.3–10.3)
C-ANCA TITR SER IF: NORMAL TITER
MYELOPEROXIDASE AB SER IA-ACNC: <0.2 UNITS (ref 0–0.9)
P-ANCA ATYPICAL TITR SER IF: NORMAL TITER
P-ANCA TITR SER IF: NORMAL TITER
PROTEINASE3 AB SER IA-ACNC: <0.2 UNITS (ref 0–0.9)

## 2025-05-13 ENCOUNTER — HOSPITAL ENCOUNTER (OUTPATIENT)
Dept: BONE DENSITY | Facility: HOSPITAL | Age: 65
Discharge: HOME | End: 2025-05-13
Admitting: NURSE PRACTITIONER
Payer: MEDICARE

## 2025-05-13 DIAGNOSIS — Z78.0 POSTMENOPAUSE: ICD-10-CM

## 2025-05-13 PROCEDURE — 77080 DXA BONE DENSITY AXIAL: CPT
